# Patient Record
Sex: FEMALE | Race: WHITE | URBAN - METROPOLITAN AREA
[De-identification: names, ages, dates, MRNs, and addresses within clinical notes are randomized per-mention and may not be internally consistent; named-entity substitution may affect disease eponyms.]

---

## 2020-07-06 ENCOUNTER — APPOINTMENT (OUTPATIENT)
Dept: URBAN - METROPOLITAN AREA CLINIC 292 | Age: 40
Setting detail: DERMATOLOGY
End: 2020-07-06

## 2020-07-06 DIAGNOSIS — B07.8 OTHER VIRAL WARTS: ICD-10-CM

## 2020-07-06 DIAGNOSIS — L82.1 OTHER SEBORRHEIC KERATOSIS: ICD-10-CM

## 2020-07-06 DIAGNOSIS — L81.4 OTHER MELANIN HYPERPIGMENTATION: ICD-10-CM

## 2020-07-06 PROCEDURE — OTHER CANTHARIDIN MULTI: OTHER

## 2020-07-06 PROCEDURE — 99203 OFFICE O/P NEW LOW 30 MIN: CPT | Mod: 25

## 2020-07-06 PROCEDURE — OTHER COUNSELING: OTHER

## 2020-07-06 PROCEDURE — 17110 DESTRUCT B9 LESION 1-14: CPT

## 2020-07-06 ASSESSMENT — LOCATION SIMPLE DESCRIPTION DERM
LOCATION SIMPLE: RIGHT HAND
LOCATION SIMPLE: LEFT HAND

## 2020-07-06 ASSESSMENT — LOCATION DETAILED DESCRIPTION DERM
LOCATION DETAILED: LEFT ULNAR DORSAL HAND
LOCATION DETAILED: RIGHT ULNAR DORSAL HAND

## 2020-07-06 ASSESSMENT — LOCATION ZONE DERM: LOCATION ZONE: HAND

## 2020-07-06 NOTE — PROCEDURE: CANTHARIDIN MULTI
Include Z78.9 (Other Specified Conditions Influencing Health Status) As An Associated Diagnosis?: No
Medical Necessity Clause: This procedure was medically necessary because the lesions that were treated were:
Strength: [facility] plus
Total Number Of Lesions Treated: 5
Medical Necessity Information: It is in your best interest to select a reason for this procedure from the list below. All of these items fulfill various CMS LCD requirements except the new and changing color options.
Detail Level: Zone
Post-Care Instructions: 1. Remove the tape and wash off the medication thoroughly with soap and water _______ hours after the medication was applied.\\n2.  Within 24-48 hours after treatment, a fluid-filled or blood-filled blister may form.  If the blister becomes very tense, throbbing and painful, it may be opened with a sterile needle to relieve the pressure.  D not open the blister unless it is very painful and tense.  Do not remove the skin that makes the blister as this serves as a good covering for the wound.  Tylenol or Ibuprofen is recommended for pain.\\n3.  No later than 72 hours after initial treatment, you will need to debride the wart.  This is the most important step in treating your wart effectively.  The wart should be debrided everyday until it is resolved.
Curette Text: Prior to application of cantharidin the lesions were lightly pared with a curette.